# Patient Record
Sex: MALE | Race: WHITE | Employment: FULL TIME | URBAN - METROPOLITAN AREA
[De-identification: names, ages, dates, MRNs, and addresses within clinical notes are randomized per-mention and may not be internally consistent; named-entity substitution may affect disease eponyms.]

---

## 2019-09-13 VITALS — BODY MASS INDEX: 44.43 KG/M2 | HEIGHT: 69 IN | WEIGHT: 300 LBS

## 2019-09-13 RX ORDER — METHOCARBAMOL 500 MG/1
750 TABLET, FILM COATED ORAL EVERY 6 HOURS PRN
COMMUNITY
Start: 2019-01-21

## 2019-09-13 RX ORDER — FENOFIBRATE 67 MG/1
67 CAPSULE ORAL DAILY
COMMUNITY
Start: 2019-05-22

## 2019-09-13 RX ORDER — ASPIRIN 81 MG/1
81 TABLET, CHEWABLE ORAL DAILY
Status: ON HOLD | COMMUNITY
End: 2019-09-19 | Stop reason: HOSPADM

## 2019-09-13 RX ORDER — ALPRAZOLAM 1 MG/1
1 TABLET ORAL 2 TIMES DAILY PRN
COMMUNITY
Start: 2019-05-22

## 2019-09-13 RX ORDER — TESTOSTERONE CYPIONATE 200 MG/ML
200 VIAL (ML) INTRAMUSCULAR
COMMUNITY

## 2019-09-13 RX ORDER — OMEPRAZOLE 40 MG/1
40 CAPSULE, DELAYED RELEASE ORAL DAILY
COMMUNITY
Start: 2019-05-22

## 2019-09-13 RX ORDER — AMLODIPINE BESYLATE 10 MG/1
10 TABLET ORAL EVERY EVENING
COMMUNITY
Start: 2019-05-22

## 2019-09-13 RX ORDER — ATORVASTATIN CALCIUM 40 MG/1
40 TABLET, FILM COATED ORAL DAILY
COMMUNITY
Start: 2019-05-22

## 2019-09-13 RX ORDER — METOPROLOL TARTRATE 50 MG/1
50 TABLET, FILM COATED ORAL 2 TIMES DAILY
COMMUNITY
Start: 2019-05-22

## 2019-09-13 RX ORDER — GABAPENTIN 300 MG/1
300 CAPSULE ORAL 2 TIMES DAILY
COMMUNITY
Start: 2019-05-22

## 2019-09-13 RX ORDER — IBUPROFEN 600 MG/1
600 TABLET ORAL 3 TIMES DAILY PRN
Status: ON HOLD | COMMUNITY
Start: 2019-05-22 | End: 2019-09-19 | Stop reason: HOSPADM

## 2019-09-13 RX ORDER — BIOTIN 5 MG
1 TABLET ORAL DAILY
COMMUNITY

## 2019-09-13 RX ORDER — RANITIDINE 300 MG/1
300 TABLET ORAL EVERY EVENING
COMMUNITY
Start: 2019-05-22

## 2019-09-13 RX ORDER — CITALOPRAM 20 MG/1
20 TABLET ORAL DAILY
COMMUNITY
Start: 2019-01-14

## 2019-09-13 NOTE — PROGRESS NOTES
Name_______________________________________Printed:____________________  Date and time of xtmlyvl_5-00-38 1100___0900 MAIN_______________   1. Do not eat or drink anything after 12 midnight (or____hours) prior to surgery. This includes no water, chewing gum or mints. Endoscopy patients follow your doctors bowel prep instructions,which may include taking part of prep after midnight. 2. Take the following pills with a small sip of water on the morning of surgery_ METOPROLOL _CITALOPRAM, GABAPENTIN _________________________________________________                  Do not take blood pressure medications ending in pril or sartan the justine prior to surgery or the morning of surgery_   3. Aspirin, Ibuprofen, Advil, Naproxen, Vitamin E and other Anti-inflammatory products should be stopped for 5 days before surgery or as directed by your physician. 4. Check with your Doctor regarding stopping Plavix, Coumadin,Eliquis, Lovenox,Effient,Pradaxa,Xarelto, Fragmin or other blood thinners and follow their instructions. 5. Do not smoke, and do not drink any alcoholic beverages 24 hours prior to surgery. This includes NA Beer. Refrain from the usage of any recreational drugs. 6. You may brush your teeth and gargle the morning of surgery. DO NOT SWALLOW WATER   7. You MUST make arrangements for a responsible adult to stay on site while you are here and take you home after your surgery. You will not be allowed to leave alone or drive yourself home. It is strongly suggested someone stay with you the first 24 hrs. Your surgery will be cancelled if you do not have a ride home. 8. A parent/legal guardian must accompany a child scheduled for surgery and plan to stay at the hospital until the child is discharged. Please do not bring other children with you.    9. Please wear simple, loose fitting clothing to the hospital.  Do not bring valuables (money, credit cards, checkbooks, etc.) Do not wear any makeup (including no eye makeup) or nail polish on your fingers or toes. 10. DO NOT wear any jewelry or piercings on day of surgery. All body piercing jewelry must be removed. 11. If you have ___dentures, they will be removed before going to the OR; we will provide you a container. If you wear ___contact lenses or ___glasses, they will be removed; please bring a case for them. 12. Please see your family doctor/pediatrician for a history & physical and/or concerning medications. Bring any test results/reports from your physician's office. PCP__________________Phone___________H&P Appt. Date________             13 If you  have a Living Will and Durable Power of  for Healthcare, please bring in a copy. 15. Notify your Surgeon if you develop any illness between now and surgery  time, cough, cold, fever, sore throat, nausea, vomiting, etc.  Please notify your surgeon if you experience dizziness, shortness of breath or blurred vision between now & the time of your surgery             15. DO NOT shave your operative site 96 hours prior to surgery. For face & neck surgery, men may use an electric razor 48 hours prior to surgery. 16. Shower the night before surgery with ___Antibacterial soap ___Hibiclens             17. To provide excellent care visitors will be limited to one in the room at any given time. 18.  Please bring picture ID and insurance card. 19.  Visit our web site for additional information:  [email or web address]              20.During flu season no children under the age of 15 are permitted in the hospital for the safety of all patients.                               21. If you take a long acting insulin in the evening only  take half of your usual  dose the night  before your procedure              22. If you use a c-pap please bring DOS if staying overnight,             23.For your convenience [facility] has a pharmacy on site to fill

## 2019-09-16 ENCOUNTER — ANESTHESIA EVENT (OUTPATIENT)
Dept: OPERATING ROOM | Age: 44
End: 2019-09-16
Payer: COMMERCIAL

## 2019-09-16 ENCOUNTER — HOSPITAL ENCOUNTER (OUTPATIENT)
Dept: PREADMISSION TESTING | Age: 44
Discharge: HOME | End: 2019-09-20
Payer: COMMERCIAL

## 2019-09-16 RX ORDER — SODIUM CHLORIDE 9 MG/ML
INJECTION, SOLUTION INTRAVENOUS CONTINUOUS
Status: CANCELLED | OUTPATIENT
Start: 2019-09-16

## 2019-09-16 RX ORDER — CLINDAMYCIN PHOSPHATE 900 MG/50ML
900 INJECTION INTRAVENOUS
Status: CANCELLED | OUTPATIENT
Start: 2019-09-16 | End: 2019-09-16

## 2019-09-16 RX ORDER — LIDOCAINE HYDROCHLORIDE 10 MG/ML
0.5 INJECTION, SOLUTION EPIDURAL; INFILTRATION; INTRACAUDAL; PERINEURAL ONCE
Status: CANCELLED | OUTPATIENT
Start: 2019-09-16 | End: 2019-09-16

## 2019-09-16 ASSESSMENT — PAIN DESCRIPTION - PROGRESSION: CLINICAL_PROGRESSION: GRADUALLY WORSENING

## 2019-09-16 ASSESSMENT — PAIN DESCRIPTION - ONSET: ONSET: PROGRESSIVE

## 2019-09-16 ASSESSMENT — PAIN DESCRIPTION - LOCATION: LOCATION: NECK;SHOULDER;ARM;HAND

## 2019-09-16 ASSESSMENT — PAIN DESCRIPTION - FREQUENCY: FREQUENCY: CONTINUOUS

## 2019-09-16 ASSESSMENT — PAIN SCALES - GENERAL: PAINLEVEL_OUTOF10: 7

## 2019-09-16 ASSESSMENT — PAIN - FUNCTIONAL ASSESSMENT: PAIN_FUNCTIONAL_ASSESSMENT: PREVENTS OR INTERFERES WITH ALL ACTIVE AND SOME PASSIVE ACTIVITIES

## 2019-09-16 ASSESSMENT — PAIN DESCRIPTION - PAIN TYPE: TYPE: CHRONIC PAIN;INTRACTABLE PAIN

## 2019-09-16 ASSESSMENT — PAIN DESCRIPTION - ORIENTATION: ORIENTATION: LEFT

## 2019-09-17 ENCOUNTER — ANESTHESIA (OUTPATIENT)
Dept: PREADMISSION TESTING | Age: 44
End: 2019-09-17
Payer: COMMERCIAL

## 2019-09-17 ENCOUNTER — HOSPITAL ENCOUNTER (OUTPATIENT)
Age: 44
Discharge: HOME | End: 2019-09-19
Attending: NEUROLOGICAL SURGERY | Admitting: NEUROLOGICAL SURGERY
Payer: COMMERCIAL

## 2019-09-17 ENCOUNTER — APPOINTMENT (OUTPATIENT)
Dept: GENERAL RADIOLOGY | Age: 44
End: 2019-09-17
Attending: NEUROLOGICAL SURGERY
Payer: COMMERCIAL

## 2019-09-17 VITALS
SYSTOLIC BLOOD PRESSURE: 130 MMHG | TEMPERATURE: 98.8 F | RESPIRATION RATE: 11 BRPM | OXYGEN SATURATION: 98 % | DIASTOLIC BLOOD PRESSURE: 76 MMHG

## 2019-09-17 PROBLEM — M50.20 CERVICAL DISC HERNIATION: Status: ACTIVE | Noted: 2019-09-17

## 2019-09-17 PROBLEM — E78.00 HIGH CHOLESTEROL: Status: ACTIVE | Noted: 2019-09-17

## 2019-09-17 PROBLEM — I10 HTN (HYPERTENSION): Status: ACTIVE | Noted: 2019-09-17

## 2019-09-17 PROBLEM — E11.9 DM2 (DIABETES MELLITUS, TYPE 2) (HCC): Status: ACTIVE | Noted: 2019-09-17

## 2019-09-17 PROBLEM — F32.A DEPRESSION: Status: ACTIVE | Noted: 2019-09-17

## 2019-09-17 LAB
GLUCOSE BLD-MCNC: 142 MG/DL (ref 70–99)
PERFORMED ON: ABNORMAL

## 2019-09-17 PROCEDURE — 2780000010 HC IMPLANT OTHER: Performed by: NEUROLOGICAL SURGERY

## 2019-09-17 PROCEDURE — 6360000002 HC RX W HCPCS: Performed by: NURSE ANESTHETIST, CERTIFIED REGISTERED

## 2019-09-17 PROCEDURE — 2720000010 HC SURG SUPPLY STERILE: Performed by: NEUROLOGICAL SURGERY

## 2019-09-17 PROCEDURE — 94761 N-INVAS EAR/PLS OXIMETRY MLT: CPT

## 2019-09-17 PROCEDURE — 2500000003 HC RX 250 WO HCPCS: Performed by: NEUROLOGICAL SURGERY

## 2019-09-17 PROCEDURE — 2580000003 HC RX 258: Performed by: NEUROLOGICAL SURGERY

## 2019-09-17 PROCEDURE — 7100000001 HC PACU RECOVERY - ADDTL 15 MIN: Performed by: NEUROLOGICAL SURGERY

## 2019-09-17 PROCEDURE — 94150 VITAL CAPACITY TEST: CPT

## 2019-09-17 PROCEDURE — 2580000003 HC RX 258: Performed by: NURSE ANESTHETIST, CERTIFIED REGISTERED

## 2019-09-17 PROCEDURE — 72020 X-RAY EXAM OF SPINE 1 VIEW: CPT

## 2019-09-17 PROCEDURE — 7100000000 HC PACU RECOVERY - FIRST 15 MIN: Performed by: NEUROLOGICAL SURGERY

## 2019-09-17 PROCEDURE — 2709999900 HC NON-CHARGEABLE SUPPLY: Performed by: NEUROLOGICAL SURGERY

## 2019-09-17 PROCEDURE — 6360000002 HC RX W HCPCS

## 2019-09-17 PROCEDURE — 3600000014 HC SURGERY LEVEL 4 ADDTL 15MIN: Performed by: NEUROLOGICAL SURGERY

## 2019-09-17 PROCEDURE — 94760 N-INVAS EAR/PLS OXIMETRY 1: CPT

## 2019-09-17 PROCEDURE — 2700000000 HC OXYGEN THERAPY PER DAY

## 2019-09-17 PROCEDURE — 6370000000 HC RX 637 (ALT 250 FOR IP): Performed by: INTERNAL MEDICINE

## 2019-09-17 PROCEDURE — 3700000001 HC ADD 15 MINUTES (ANESTHESIA): Performed by: NEUROLOGICAL SURGERY

## 2019-09-17 PROCEDURE — 3600000004 HC SURGERY LEVEL 4 BASE: Performed by: NEUROLOGICAL SURGERY

## 2019-09-17 PROCEDURE — 2500000003 HC RX 250 WO HCPCS: Performed by: NURSE ANESTHETIST, CERTIFIED REGISTERED

## 2019-09-17 PROCEDURE — 6370000000 HC RX 637 (ALT 250 FOR IP): Performed by: NEUROLOGICAL SURGERY

## 2019-09-17 PROCEDURE — 3700000000 HC ANESTHESIA ATTENDED CARE: Performed by: NEUROLOGICAL SURGERY

## 2019-09-17 PROCEDURE — C1713 ANCHOR/SCREW BN/BN,TIS/BN: HCPCS | Performed by: NEUROLOGICAL SURGERY

## 2019-09-17 PROCEDURE — C9113 INJ PANTOPRAZOLE SODIUM, VIA: HCPCS

## 2019-09-17 DEVICE — DBM T45001 1CC PASTE GRAFTON PLUS
Type: IMPLANTABLE DEVICE | Site: NECK | Status: FUNCTIONAL
Brand: GRAFTON®AND GRAFTON PLUS®DEMINERALIZED BONE MATRIX (DBM)

## 2019-09-17 DEVICE — GRAFT HUM TISS W11XH6XL14MM CORT LORDTC SPCR BLK: Type: IMPLANTABLE DEVICE | Site: NECK | Status: FUNCTIONAL

## 2019-09-17 DEVICE — BONE SCREW 9791325 4.0X15 SELF DRILL VAR
Type: IMPLANTABLE DEVICE | Site: NECK | Status: FUNCTIONAL
Brand: VENTURE™ ANTERIOR CERVICAL PLATE SYSTEM

## 2019-09-17 DEVICE — IMPLANTABLE DEVICE: Type: IMPLANTABLE DEVICE | Site: NECK | Status: FUNCTIONAL

## 2019-09-17 RX ORDER — SODIUM CHLORIDE 9 MG/ML
INJECTION, SOLUTION INTRAVENOUS CONTINUOUS
Status: CANCELLED | OUTPATIENT
Start: 2019-09-17

## 2019-09-17 RX ORDER — FAMOTIDINE 20 MG/1
40 TABLET, FILM COATED ORAL EVERY EVENING
Status: DISCONTINUED | OUTPATIENT
Start: 2019-09-17 | End: 2019-09-19 | Stop reason: HOSPADM

## 2019-09-17 RX ORDER — SODIUM CHLORIDE 0.9 % (FLUSH) 0.9 %
10 SYRINGE (ML) INJECTION EVERY 12 HOURS SCHEDULED
Status: DISCONTINUED | OUTPATIENT
Start: 2019-09-17 | End: 2019-09-19 | Stop reason: HOSPADM

## 2019-09-17 RX ORDER — CLINDAMYCIN PHOSPHATE 900 MG/50ML
900 INJECTION INTRAVENOUS
Status: COMPLETED | OUTPATIENT
Start: 2019-09-17 | End: 2019-09-17

## 2019-09-17 RX ORDER — OXYCODONE HYDROCHLORIDE 5 MG/1
10 TABLET ORAL EVERY 4 HOURS PRN
Status: DISCONTINUED | OUTPATIENT
Start: 2019-09-17 | End: 2019-09-19 | Stop reason: HOSPADM

## 2019-09-17 RX ORDER — ONDANSETRON 2 MG/ML
4 INJECTION INTRAMUSCULAR; INTRAVENOUS EVERY 6 HOURS PRN
Status: DISCONTINUED | OUTPATIENT
Start: 2019-09-17 | End: 2019-09-19 | Stop reason: HOSPADM

## 2019-09-17 RX ORDER — PROMETHAZINE HYDROCHLORIDE 25 MG/ML
INJECTION, SOLUTION INTRAMUSCULAR; INTRAVENOUS PRN
Status: DISCONTINUED | OUTPATIENT
Start: 2019-09-17 | End: 2019-09-17 | Stop reason: SDUPTHER

## 2019-09-17 RX ORDER — ALPRAZOLAM 0.5 MG/1
1 TABLET ORAL 2 TIMES DAILY PRN
Status: DISCONTINUED | OUTPATIENT
Start: 2019-09-17 | End: 2019-09-19 | Stop reason: HOSPADM

## 2019-09-17 RX ORDER — SUCCINYLCHOLINE CHLORIDE 20 MG/ML
INJECTION INTRAMUSCULAR; INTRAVENOUS PRN
Status: DISCONTINUED | OUTPATIENT
Start: 2019-09-17 | End: 2019-09-17 | Stop reason: SDUPTHER

## 2019-09-17 RX ORDER — PROPOFOL 10 MG/ML
INJECTION, EMULSION INTRAVENOUS PRN
Status: DISCONTINUED | OUTPATIENT
Start: 2019-09-17 | End: 2019-09-17 | Stop reason: SDUPTHER

## 2019-09-17 RX ORDER — GABAPENTIN 300 MG/1
300 CAPSULE ORAL 2 TIMES DAILY
Status: DISCONTINUED | OUTPATIENT
Start: 2019-09-17 | End: 2019-09-19 | Stop reason: HOSPADM

## 2019-09-17 RX ORDER — POTASSIUM CHLORIDE 7.45 MG/ML
10 INJECTION INTRAVENOUS PRN
Status: DISCONTINUED | OUTPATIENT
Start: 2019-09-17 | End: 2019-09-19 | Stop reason: HOSPADM

## 2019-09-17 RX ORDER — FENTANYL CITRATE 50 UG/ML
50 INJECTION, SOLUTION INTRAMUSCULAR; INTRAVENOUS EVERY 5 MIN PRN
Status: DISCONTINUED | OUTPATIENT
Start: 2019-09-17 | End: 2019-09-17 | Stop reason: HOSPADM

## 2019-09-17 RX ORDER — PROPOFOL 10 MG/ML
INJECTION, EMULSION INTRAVENOUS CONTINUOUS PRN
Status: DISCONTINUED | OUTPATIENT
Start: 2019-09-17 | End: 2019-09-17 | Stop reason: SDUPTHER

## 2019-09-17 RX ORDER — AMLODIPINE BESYLATE 5 MG/1
10 TABLET ORAL EVERY EVENING
Status: DISCONTINUED | OUTPATIENT
Start: 2019-09-17 | End: 2019-09-19 | Stop reason: HOSPADM

## 2019-09-17 RX ORDER — PROPOFOL 10 MG/ML
INJECTION, EMULSION INTRAVENOUS
Status: DISCONTINUED
Start: 2019-09-17 | End: 2019-09-17

## 2019-09-17 RX ORDER — SODIUM CHLORIDE 9 MG/ML
INJECTION, SOLUTION INTRAVENOUS CONTINUOUS
Status: DISCONTINUED | OUTPATIENT
Start: 2019-09-17 | End: 2019-09-17

## 2019-09-17 RX ORDER — FENTANYL CITRATE 50 UG/ML
25 INJECTION, SOLUTION INTRAMUSCULAR; INTRAVENOUS EVERY 5 MIN PRN
Status: DISCONTINUED | OUTPATIENT
Start: 2019-09-17 | End: 2019-09-17 | Stop reason: HOSPADM

## 2019-09-17 RX ORDER — HYDRALAZINE HYDROCHLORIDE 20 MG/ML
10 INJECTION INTRAMUSCULAR; INTRAVENOUS EVERY 6 HOURS PRN
Status: DISCONTINUED | OUTPATIENT
Start: 2019-09-17 | End: 2019-09-19 | Stop reason: HOSPADM

## 2019-09-17 RX ORDER — FENOFIBRATE 160 MG/1
160 TABLET ORAL DAILY
Status: DISCONTINUED | OUTPATIENT
Start: 2019-09-17 | End: 2019-09-19 | Stop reason: HOSPADM

## 2019-09-17 RX ORDER — EPHEDRINE SULFATE 50 MG/ML
INJECTION INTRAVENOUS PRN
Status: DISCONTINUED | OUTPATIENT
Start: 2019-09-17 | End: 2019-09-17 | Stop reason: SDUPTHER

## 2019-09-17 RX ORDER — FENTANYL CITRATE 50 UG/ML
INJECTION, SOLUTION INTRAMUSCULAR; INTRAVENOUS PRN
Status: DISCONTINUED | OUTPATIENT
Start: 2019-09-17 | End: 2019-09-17 | Stop reason: SDUPTHER

## 2019-09-17 RX ORDER — SODIUM CHLORIDE, SODIUM LACTATE, POTASSIUM CHLORIDE, CALCIUM CHLORIDE 600; 310; 30; 20 MG/100ML; MG/100ML; MG/100ML; MG/100ML
INJECTION, SOLUTION INTRAVENOUS CONTINUOUS
Status: CANCELLED | OUTPATIENT
Start: 2019-09-17

## 2019-09-17 RX ORDER — 0.9 % SODIUM CHLORIDE 0.9 %
500 INTRAVENOUS SOLUTION INTRAVENOUS PRN
Status: DISCONTINUED | OUTPATIENT
Start: 2019-09-17 | End: 2019-09-19 | Stop reason: HOSPADM

## 2019-09-17 RX ORDER — PANTOPRAZOLE SODIUM 40 MG/1
40 TABLET, DELAYED RELEASE ORAL
Status: DISCONTINUED | OUTPATIENT
Start: 2019-09-18 | End: 2019-09-19 | Stop reason: HOSPADM

## 2019-09-17 RX ORDER — HYDROMORPHONE HYDROCHLORIDE 1 MG/ML
0.5 INJECTION, SOLUTION INTRAMUSCULAR; INTRAVENOUS; SUBCUTANEOUS
Status: DISCONTINUED | OUTPATIENT
Start: 2019-09-17 | End: 2019-09-19 | Stop reason: HOSPADM

## 2019-09-17 RX ORDER — CLINDAMYCIN PHOSPHATE 900 MG/50ML
900 INJECTION INTRAVENOUS EVERY 8 HOURS
Status: COMPLETED | OUTPATIENT
Start: 2019-09-17 | End: 2019-09-18

## 2019-09-17 RX ORDER — NICOTINE 21 MG/24HR
1 PATCH, TRANSDERMAL 24 HOURS TRANSDERMAL DAILY
Status: DISCONTINUED | OUTPATIENT
Start: 2019-09-17 | End: 2019-09-19 | Stop reason: HOSPADM

## 2019-09-17 RX ORDER — SODIUM CHLORIDE 9 MG/ML
INJECTION, SOLUTION INTRAVENOUS CONTINUOUS PRN
Status: DISCONTINUED | OUTPATIENT
Start: 2019-09-17 | End: 2019-09-17 | Stop reason: SDUPTHER

## 2019-09-17 RX ORDER — PROPOFOL 10 MG/ML
INJECTION, EMULSION INTRAVENOUS
Status: COMPLETED
Start: 2019-09-17 | End: 2019-09-17

## 2019-09-17 RX ORDER — LIDOCAINE HYDROCHLORIDE 20 MG/ML
INJECTION, SOLUTION EPIDURAL; INFILTRATION; INTRACAUDAL; PERINEURAL PRN
Status: DISCONTINUED | OUTPATIENT
Start: 2019-09-17 | End: 2019-09-17 | Stop reason: SDUPTHER

## 2019-09-17 RX ORDER — DEXTROSE, SODIUM CHLORIDE, AND POTASSIUM CHLORIDE 5; .45; .15 G/100ML; G/100ML; G/100ML
1000 INJECTION INTRAVENOUS CONTINUOUS
Status: DISCONTINUED | OUTPATIENT
Start: 2019-09-17 | End: 2019-09-19 | Stop reason: HOSPADM

## 2019-09-17 RX ORDER — MAGNESIUM HYDROXIDE 1200 MG/15ML
LIQUID ORAL CONTINUOUS PRN
Status: COMPLETED | OUTPATIENT
Start: 2019-09-17 | End: 2019-09-17

## 2019-09-17 RX ORDER — DEXAMETHASONE SODIUM PHOSPHATE 4 MG/ML
INJECTION, SOLUTION INTRA-ARTICULAR; INTRALESIONAL; INTRAMUSCULAR; INTRAVENOUS; SOFT TISSUE PRN
Status: DISCONTINUED | OUTPATIENT
Start: 2019-09-17 | End: 2019-09-17 | Stop reason: SDUPTHER

## 2019-09-17 RX ORDER — DOCUSATE SODIUM 100 MG/1
100 CAPSULE, LIQUID FILLED ORAL 2 TIMES DAILY
Status: DISCONTINUED | OUTPATIENT
Start: 2019-09-17 | End: 2019-09-19 | Stop reason: HOSPADM

## 2019-09-17 RX ORDER — CITALOPRAM 20 MG/1
20 TABLET ORAL DAILY
Status: DISCONTINUED | OUTPATIENT
Start: 2019-09-18 | End: 2019-09-19 | Stop reason: HOSPADM

## 2019-09-17 RX ORDER — GLYCOPYRROLATE 1 MG/5 ML
SYRINGE (ML) INTRAVENOUS PRN
Status: DISCONTINUED | OUTPATIENT
Start: 2019-09-17 | End: 2019-09-17 | Stop reason: SDUPTHER

## 2019-09-17 RX ORDER — HYDROMORPHONE HCL 110MG/55ML
0.25 PATIENT CONTROLLED ANALGESIA SYRINGE INTRAVENOUS EVERY 5 MIN PRN
Status: DISCONTINUED | OUTPATIENT
Start: 2019-09-17 | End: 2019-09-17 | Stop reason: HOSPADM

## 2019-09-17 RX ORDER — PANTOPRAZOLE SODIUM 40 MG/10ML
INJECTION, POWDER, LYOPHILIZED, FOR SOLUTION INTRAVENOUS
Status: COMPLETED
Start: 2019-09-17 | End: 2019-09-17

## 2019-09-17 RX ORDER — PANTOPRAZOLE SODIUM 40 MG/1
40 TABLET, DELAYED RELEASE ORAL DAILY
Status: DISCONTINUED | OUTPATIENT
Start: 2019-09-17 | End: 2019-09-17 | Stop reason: SDUPTHER

## 2019-09-17 RX ORDER — SODIUM CHLORIDE 0.9 % (FLUSH) 0.9 %
10 SYRINGE (ML) INJECTION PRN
Status: DISCONTINUED | OUTPATIENT
Start: 2019-09-17 | End: 2019-09-19 | Stop reason: HOSPADM

## 2019-09-17 RX ORDER — BUPIVACAINE HYDROCHLORIDE 5 MG/ML
INJECTION, SOLUTION EPIDURAL; INTRACAUDAL
Status: COMPLETED | OUTPATIENT
Start: 2019-09-17 | End: 2019-09-17

## 2019-09-17 RX ORDER — SODIUM CHLORIDE 0.9 % (FLUSH) 0.9 %
10 SYRINGE (ML) INJECTION EVERY 12 HOURS SCHEDULED
Status: CANCELLED | OUTPATIENT
Start: 2019-09-17

## 2019-09-17 RX ORDER — LABETALOL HYDROCHLORIDE 5 MG/ML
5 INJECTION, SOLUTION INTRAVENOUS EVERY 10 MIN PRN
Status: DISCONTINUED | OUTPATIENT
Start: 2019-09-17 | End: 2019-09-17 | Stop reason: HOSPADM

## 2019-09-17 RX ORDER — PANTOPRAZOLE SODIUM 40 MG/10ML
40 INJECTION, POWDER, LYOPHILIZED, FOR SOLUTION INTRAVENOUS DAILY
Status: DISCONTINUED | OUTPATIENT
Start: 2019-09-17 | End: 2019-09-17

## 2019-09-17 RX ORDER — POTASSIUM CHLORIDE 20 MEQ/1
40 TABLET, EXTENDED RELEASE ORAL PRN
Status: DISCONTINUED | OUTPATIENT
Start: 2019-09-17 | End: 2019-09-19 | Stop reason: HOSPADM

## 2019-09-17 RX ORDER — CYCLOBENZAPRINE HCL 10 MG
10 TABLET ORAL 3 TIMES DAILY PRN
Status: DISCONTINUED | OUTPATIENT
Start: 2019-09-17 | End: 2019-09-19 | Stop reason: HOSPADM

## 2019-09-17 RX ORDER — SODIUM CHLORIDE 0.9 % (FLUSH) 0.9 %
10 SYRINGE (ML) INJECTION PRN
Status: CANCELLED | OUTPATIENT
Start: 2019-09-17

## 2019-09-17 RX ORDER — METOPROLOL TARTRATE 50 MG/1
50 TABLET, FILM COATED ORAL 2 TIMES DAILY
Status: DISCONTINUED | OUTPATIENT
Start: 2019-09-17 | End: 2019-09-19 | Stop reason: HOSPADM

## 2019-09-17 RX ORDER — ATORVASTATIN CALCIUM 40 MG/1
40 TABLET, FILM COATED ORAL NIGHTLY
Status: DISCONTINUED | OUTPATIENT
Start: 2019-09-17 | End: 2019-09-19 | Stop reason: HOSPADM

## 2019-09-17 RX ORDER — RANITIDINE 150 MG/1
300 TABLET ORAL EVERY EVENING
Status: DISCONTINUED | OUTPATIENT
Start: 2019-09-17 | End: 2019-09-17 | Stop reason: CLARIF

## 2019-09-17 RX ORDER — LIDOCAINE HYDROCHLORIDE 10 MG/ML
0.5 INJECTION, SOLUTION EPIDURAL; INFILTRATION; INTRACAUDAL; PERINEURAL ONCE
Status: DISCONTINUED | OUTPATIENT
Start: 2019-09-17 | End: 2019-09-17 | Stop reason: HOSPADM

## 2019-09-17 RX ORDER — OXYCODONE HYDROCHLORIDE 5 MG/1
5 TABLET ORAL EVERY 4 HOURS PRN
Status: DISCONTINUED | OUTPATIENT
Start: 2019-09-17 | End: 2019-09-19 | Stop reason: HOSPADM

## 2019-09-17 RX ORDER — HYDROMORPHONE HCL 110MG/55ML
PATIENT CONTROLLED ANALGESIA SYRINGE INTRAVENOUS PRN
Status: DISCONTINUED | OUTPATIENT
Start: 2019-09-17 | End: 2019-09-17 | Stop reason: SDUPTHER

## 2019-09-17 RX ORDER — ESMOLOL HYDROCHLORIDE 10 MG/ML
INJECTION INTRAVENOUS PRN
Status: DISCONTINUED | OUTPATIENT
Start: 2019-09-17 | End: 2019-09-17 | Stop reason: SDUPTHER

## 2019-09-17 RX ORDER — HYDROMORPHONE HCL 110MG/55ML
0.5 PATIENT CONTROLLED ANALGESIA SYRINGE INTRAVENOUS EVERY 5 MIN PRN
Status: DISCONTINUED | OUTPATIENT
Start: 2019-09-17 | End: 2019-09-17 | Stop reason: HOSPADM

## 2019-09-17 RX ORDER — NALOXONE HYDROCHLORIDE 0.4 MG/ML
INJECTION, SOLUTION INTRAMUSCULAR; INTRAVENOUS; SUBCUTANEOUS PRN
Status: DISCONTINUED | OUTPATIENT
Start: 2019-09-17 | End: 2019-09-17 | Stop reason: SDUPTHER

## 2019-09-17 RX ORDER — OMEPRAZOLE 20 MG/1
40 CAPSULE, DELAYED RELEASE ORAL DAILY
Status: DISCONTINUED | OUTPATIENT
Start: 2019-09-17 | End: 2019-09-17 | Stop reason: CLARIF

## 2019-09-17 RX ADMIN — HYDROMORPHONE HYDROCHLORIDE 0.5 MG: 2 INJECTION, SOLUTION INTRAMUSCULAR; INTRAVENOUS; SUBCUTANEOUS at 13:21

## 2019-09-17 RX ADMIN — HYDROMORPHONE HYDROCHLORIDE 0.5 MG: 2 INJECTION, SOLUTION INTRAMUSCULAR; INTRAVENOUS; SUBCUTANEOUS at 13:55

## 2019-09-17 RX ADMIN — SODIUM CHLORIDE: 9 INJECTION, SOLUTION INTRAVENOUS at 13:09

## 2019-09-17 RX ADMIN — PANTOPRAZOLE SODIUM 40 MG: 40 INJECTION, POWDER, LYOPHILIZED, FOR SOLUTION INTRAVENOUS at 10:20

## 2019-09-17 RX ADMIN — HYDROMORPHONE HYDROCHLORIDE 0.5 MG: 2 INJECTION, SOLUTION INTRAMUSCULAR; INTRAVENOUS; SUBCUTANEOUS at 12:59

## 2019-09-17 RX ADMIN — HYDROMORPHONE HYDROCHLORIDE 0.5 MG: 2 INJECTION, SOLUTION INTRAMUSCULAR; INTRAVENOUS; SUBCUTANEOUS at 13:42

## 2019-09-17 RX ADMIN — POTASSIUM CHLORIDE, DEXTROSE MONOHYDRATE AND SODIUM CHLORIDE 1000 ML: 150; 5; 450 INJECTION, SOLUTION INTRAVENOUS at 21:51

## 2019-09-17 RX ADMIN — HYDROMORPHONE HYDROCHLORIDE 0.5 MG: 1 INJECTION, SOLUTION INTRAMUSCULAR; INTRAVENOUS; SUBCUTANEOUS at 18:53

## 2019-09-17 RX ADMIN — DEXAMETHASONE SODIUM PHOSPHATE 8 MG: 4 INJECTION, SOLUTION INTRAMUSCULAR; INTRAVENOUS at 11:42

## 2019-09-17 RX ADMIN — EPHEDRINE SULFATE 10 MG: 50 INJECTION, SOLUTION INTRAVENOUS at 11:25

## 2019-09-17 RX ADMIN — PROMETHAZINE HYDROCHLORIDE 12.5 MG: 25 INJECTION INTRAMUSCULAR; INTRAVENOUS at 11:54

## 2019-09-17 RX ADMIN — FENTANYL CITRATE 50 MCG: 50 INJECTION, SOLUTION INTRAMUSCULAR; INTRAVENOUS at 11:59

## 2019-09-17 RX ADMIN — PROPOFOL 100 MCG/KG/MIN: 10 INJECTION, EMULSION INTRAVENOUS at 11:25

## 2019-09-17 RX ADMIN — PHENYLEPHRINE HYDROCHLORIDE 100 MCG/MIN: 10 INJECTION INTRAVENOUS at 11:42

## 2019-09-17 RX ADMIN — FENTANYL CITRATE 100 MCG: 50 INJECTION, SOLUTION INTRAMUSCULAR; INTRAVENOUS at 11:18

## 2019-09-17 RX ADMIN — NALOXONE HYDROCHLORIDE 0.4 MG: 0.4 INJECTION, SOLUTION INTRAMUSCULAR; INTRAVENOUS; SUBCUTANEOUS at 14:55

## 2019-09-17 RX ADMIN — Medication 0.2 MG: at 11:25

## 2019-09-17 RX ADMIN — SODIUM CHLORIDE: 9 INJECTION, SOLUTION INTRAVENOUS at 11:05

## 2019-09-17 RX ADMIN — PHENYLEPHRINE HYDROCHLORIDE 100 MCG: 10 INJECTION INTRAVENOUS at 11:35

## 2019-09-17 RX ADMIN — CLINDAMYCIN PHOSPHATE 900 MG: 900 INJECTION, SOLUTION INTRAVENOUS at 19:07

## 2019-09-17 RX ADMIN — FENTANYL CITRATE 50 MCG: 50 INJECTION, SOLUTION INTRAMUSCULAR; INTRAVENOUS at 14:14

## 2019-09-17 RX ADMIN — EPHEDRINE SULFATE 10 MG: 50 INJECTION, SOLUTION INTRAVENOUS at 11:34

## 2019-09-17 RX ADMIN — DOCUSATE SODIUM 100 MG: 100 CAPSULE, LIQUID FILLED ORAL at 21:46

## 2019-09-17 RX ADMIN — PANTOPRAZOLE SODIUM 40 MG: 40 INJECTION, POWDER, FOR SOLUTION INTRAVENOUS at 10:20

## 2019-09-17 RX ADMIN — METOPROLOL TARTRATE 50 MG: 50 TABLET, FILM COATED ORAL at 21:47

## 2019-09-17 RX ADMIN — FENTANYL CITRATE 50 MCG: 50 INJECTION, SOLUTION INTRAMUSCULAR; INTRAVENOUS at 12:02

## 2019-09-17 RX ADMIN — DESMOPRESSIN ACETATE 40 MG: 0.2 TABLET ORAL at 21:46

## 2019-09-17 RX ADMIN — GABAPENTIN 300 MG: 300 CAPSULE ORAL at 21:47

## 2019-09-17 RX ADMIN — CLINDAMYCIN PHOSPHATE 900 MG: 900 INJECTION, SOLUTION INTRAVENOUS at 11:01

## 2019-09-17 RX ADMIN — FENTANYL CITRATE 50 MCG: 50 INJECTION, SOLUTION INTRAMUSCULAR; INTRAVENOUS at 11:40

## 2019-09-17 RX ADMIN — EPHEDRINE SULFATE 10 MG: 50 INJECTION, SOLUTION INTRAVENOUS at 11:30

## 2019-09-17 RX ADMIN — SUCCINYLCHOLINE CHLORIDE 200 MG: 20 INJECTION, SOLUTION INTRAMUSCULAR; INTRAVENOUS at 11:18

## 2019-09-17 RX ADMIN — PROPOFOL 250 MG: 10 INJECTION, EMULSION INTRAVENOUS at 11:18

## 2019-09-17 RX ADMIN — LIDOCAINE HYDROCHLORIDE 100 MG: 20 INJECTION, SOLUTION EPIDURAL; INFILTRATION; INTRACAUDAL; PERINEURAL at 11:18

## 2019-09-17 RX ADMIN — ESMOLOL HYDROCHLORIDE 10 MG: 10 INJECTION, SOLUTION INTRAVENOUS at 14:09

## 2019-09-17 RX ADMIN — OXYCODONE HYDROCHLORIDE 10 MG: 5 TABLET ORAL at 21:47

## 2019-09-17 ASSESSMENT — PULMONARY FUNCTION TESTS
PIF_VALUE: 27
PIF_VALUE: 20
PIF_VALUE: 1
PIF_VALUE: 27
PIF_VALUE: 28
PIF_VALUE: 32
PIF_VALUE: 24
PIF_VALUE: 27
PIF_VALUE: 20
PIF_VALUE: 27
PIF_VALUE: 32
PIF_VALUE: 31
PIF_VALUE: 29
PIF_VALUE: 27
PIF_VALUE: 29
PIF_VALUE: 27
PIF_VALUE: 27
PIF_VALUE: 30
PIF_VALUE: 24
PIF_VALUE: 28
PIF_VALUE: 4
PIF_VALUE: 21
PIF_VALUE: 27
PIF_VALUE: 27
PIF_VALUE: 15
PIF_VALUE: 16
PIF_VALUE: 29
PIF_VALUE: 20
PIF_VALUE: 27
PIF_VALUE: 30
PIF_VALUE: 27
PIF_VALUE: 30
PIF_VALUE: 27
PIF_VALUE: 32
PIF_VALUE: 27
PIF_VALUE: 27
PIF_VALUE: 29
PIF_VALUE: 29
PIF_VALUE: 27
PIF_VALUE: 26
PIF_VALUE: 31
PIF_VALUE: 29
PIF_VALUE: 27
PIF_VALUE: 29
PIF_VALUE: 27
PIF_VALUE: 27
PIF_VALUE: 18
PIF_VALUE: 29
PIF_VALUE: 30
PIF_VALUE: 28
PIF_VALUE: 19
PIF_VALUE: 16
PIF_VALUE: 30
PIF_VALUE: 19
PIF_VALUE: 28
PIF_VALUE: 28
PIF_VALUE: 30
PIF_VALUE: 20
PIF_VALUE: 28
PIF_VALUE: 31
PIF_VALUE: 27
PIF_VALUE: 28
PIF_VALUE: 28
PIF_VALUE: 27
PIF_VALUE: 1
PIF_VALUE: 27
PIF_VALUE: 29
PIF_VALUE: 26
PIF_VALUE: 30
PIF_VALUE: 17
PIF_VALUE: 20
PIF_VALUE: 2
PIF_VALUE: 28
PIF_VALUE: 28
PIF_VALUE: 34
PIF_VALUE: 30
PIF_VALUE: 27
PIF_VALUE: 24
PIF_VALUE: 27
PIF_VALUE: 29
PIF_VALUE: 29
PIF_VALUE: 3
PIF_VALUE: 31
PIF_VALUE: 30
PIF_VALUE: 27
PIF_VALUE: 29
PIF_VALUE: 27
PIF_VALUE: 27
PIF_VALUE: 28
PIF_VALUE: 27
PIF_VALUE: 30
PIF_VALUE: 31
PIF_VALUE: 29
PIF_VALUE: 27
PIF_VALUE: 28
PIF_VALUE: 25
PIF_VALUE: 27
PIF_VALUE: 31
PIF_VALUE: 22
PIF_VALUE: 32
PIF_VALUE: 28
PIF_VALUE: 26
PIF_VALUE: 0
PIF_VALUE: 26
PIF_VALUE: 17
PIF_VALUE: 28
PIF_VALUE: 29
PIF_VALUE: 28
PIF_VALUE: 32
PIF_VALUE: 27
PIF_VALUE: 29
PIF_VALUE: 27
PIF_VALUE: 18
PIF_VALUE: 31
PIF_VALUE: 27
PIF_VALUE: 29
PIF_VALUE: 27
PIF_VALUE: 18
PIF_VALUE: 27
PIF_VALUE: 32
PIF_VALUE: 29
PIF_VALUE: 32
PIF_VALUE: 27
PIF_VALUE: 31
PIF_VALUE: 29
PIF_VALUE: 27
PIF_VALUE: 23
PIF_VALUE: 2
PIF_VALUE: 29
PIF_VALUE: 13
PIF_VALUE: 27
PIF_VALUE: 30
PIF_VALUE: 29
PIF_VALUE: 23
PIF_VALUE: 29
PIF_VALUE: 29
PIF_VALUE: 27
PIF_VALUE: 26
PIF_VALUE: 27
PIF_VALUE: 30
PIF_VALUE: 0
PIF_VALUE: 27
PIF_VALUE: 28
PIF_VALUE: 27
PIF_VALUE: 28
PIF_VALUE: 2
PIF_VALUE: 29
PIF_VALUE: 2
PIF_VALUE: 30
PIF_VALUE: 27
PIF_VALUE: 27
PIF_VALUE: 2
PIF_VALUE: 29
PIF_VALUE: 1
PIF_VALUE: 27
PIF_VALUE: 30
PIF_VALUE: 21
PIF_VALUE: 28
PIF_VALUE: 28
PIF_VALUE: 30
PIF_VALUE: 25
PIF_VALUE: 27
PIF_VALUE: 29
PIF_VALUE: 27
PIF_VALUE: 2
PIF_VALUE: 28
PIF_VALUE: 27
PIF_VALUE: 27
PIF_VALUE: 29
PIF_VALUE: 30
PIF_VALUE: 25
PIF_VALUE: 27
PIF_VALUE: 29
PIF_VALUE: 27
PIF_VALUE: 27
PIF_VALUE: 30
PIF_VALUE: 29
PIF_VALUE: 29
PIF_VALUE: 28
PIF_VALUE: 0
PIF_VALUE: 27
PIF_VALUE: 18
PIF_VALUE: 28
PIF_VALUE: 27
PIF_VALUE: 27
PIF_VALUE: 18
PIF_VALUE: 30
PIF_VALUE: 27
PIF_VALUE: 27
PIF_VALUE: 28
PIF_VALUE: 29
PIF_VALUE: 29
PIF_VALUE: 27
PIF_VALUE: 33
PIF_VALUE: 27
PIF_VALUE: 30
PIF_VALUE: 28
PIF_VALUE: 27
PIF_VALUE: 32
PIF_VALUE: 20
PIF_VALUE: 29
PIF_VALUE: 2
PIF_VALUE: 29
PIF_VALUE: 21
PIF_VALUE: 27
PIF_VALUE: 28
PIF_VALUE: 30
PIF_VALUE: 28
PIF_VALUE: 27
PIF_VALUE: 1
PIF_VALUE: 26

## 2019-09-17 ASSESSMENT — PAIN DESCRIPTION - FREQUENCY: FREQUENCY: CONTINUOUS

## 2019-09-17 ASSESSMENT — PAIN DESCRIPTION - ORIENTATION: ORIENTATION: MID

## 2019-09-17 ASSESSMENT — PAIN DESCRIPTION - PAIN TYPE: TYPE: SURGICAL PAIN

## 2019-09-17 ASSESSMENT — PAIN SCALES - GENERAL
PAINLEVEL_OUTOF10: 7
PAINLEVEL_OUTOF10: 6
PAINLEVEL_OUTOF10: 7

## 2019-09-17 ASSESSMENT — PAIN DESCRIPTION - LOCATION: LOCATION: NECK

## 2019-09-17 ASSESSMENT — PAIN DESCRIPTION - DESCRIPTORS: DESCRIPTORS: ACHING

## 2019-09-17 ASSESSMENT — PAIN DESCRIPTION - DIRECTION: RADIATING_TOWARDS: SHOULDER BLADES

## 2019-09-17 NOTE — PROGRESS NOTES
Incentive Spirometry education and demonstration completed by Respiratory Therapy Yes      Response to education: Excellent     Teaching Time: 5 minutes    Minimum Predicted Vital Capacity - 776 mL. Patient's Actual Vital Capacity - 1800 mL. Turning over to Nursing for routine follow-up Yes.     Comments: IS goal met IS turned to nursing    Electronically signed by Merlyn Anderson on 9/17/2019 at 5:35 PM

## 2019-09-17 NOTE — ANESTHESIA PRE PROCEDURE
Anxiety and depression     BPH (benign prostatic hyperplasia)     Cervical spinal stenosis     Diabetes mellitus (HCC)     GERD (gastroesophageal reflux disease)     H/O difficult intubation     used glidescope in the past    Hyperlipidemia     Hypertension     CHINTAN on CPAP     Uncontrolled diabetes mellitus (HCC)        Past Surgical History:        Procedure Laterality Date    CARDIAC CATHETERIZATION  01/31/2019    CHOLECYSTECTOMY      KNEE CARTILAGE SURGERY Right     X2    ROTATOR CUFF REPAIR      TONSILLECTOMY         Social History:    Social History     Tobacco Use    Smoking status: Former Smoker    Smokeless tobacco: Current User     Types: Snuff, Chew    Tobacco comment: uses 1 can/day SINCE AGE 7   Substance Use Topics    Alcohol use: Not Currently                                Ready to quit: Not Answered  Counseling given: Not Answered  Comment: uses 1 can/day SINCE AGE 7      Vital Signs (Current):   Vitals:    09/13/19 1708   Weight: 300 lb (136.1 kg)   Height: 5' 8.5\" (1.74 m)                                              BP Readings from Last 3 Encounters:   No data found for BP       NPO Status:                                                                                 BMI:   Wt Readings from Last 3 Encounters:   09/13/19 300 lb (136.1 kg)     Body mass index is 44.95 kg/m². CBC: No results found for: WBC, RBC, HGB, HCT, MCV, RDW, PLT    CMP: No results found for: NA, K, CL, CO2, BUN, CREATININE, GFRAA, AGRATIO, LABGLOM, GLUCOSE, PROT, CALCIUM, BILITOT, ALKPHOS, AST, ALT    POC Tests: No results for input(s): POCGLU, POCNA, POCK, POCCL, POCBUN, POCHEMO, POCHCT in the last 72 hours.     Coags: No results found for: PROTIME, INR, APTT    HCG (If Applicable): No results found for: PREGTESTUR, PREGSERUM, HCG, HCGQUANT     ABGs: No results found for: PHART, PO2ART, EKI5ZTB, GIQ8VQN, BEART, A6XDASNZ     Type & Screen (If Applicable):  No results found for: LABABO,
intravenous and rapid sequence. Anesthetic plan and risks discussed with patient. Plan discussed with CRNA. MEDICATIONS:  Current Facility-Administered Medications   Medication Dose Route Frequency Provider Last Rate Last Dose    0.9 % sodium chloride infusion   Intravenous Continuous Michelle Cespedes MD        clindamycin (CLEOCIN) 900 mg in dextrose 5 % 50 mL IVPB  900 mg Intravenous On Call to [address] [place] [address], MD        lidocaine PF 1 % injection 0.5 mL  0.5 mL Intradermal Once Michelle Cespedes MD        pantoprazole (PROTONIX) 40 MG injection             pantoprazole (PROTONIX) injection 40 mg  40 mg Intravenous Daily Lb Lang MD            LABS:  No results found for: WBC, HGB, HCT, MCV, PLT, GLUCOSE, PROTIME, INR, APTT    No results found for: NA, K, CL, CO2, PHOS, BUN, CREATININE    Problem List:  There is no problem list on file for this patient.           Unknown MD Madeline   9/17/2019

## 2019-09-17 NOTE — BRIEF OP NOTE
Brief Postoperative Note  ______________________________________________________________    Patient: Liam Omalley  YOB: 1975  MRN: [id number]  Date of Procedure: 9/17/2019    Pre-Op Diagnosis: M50.021 CERVICAL MYELOPATHY C4C5  M50.022 CERVICAL MYELOPATHY C5-C6  M50.023 CERVICAL MYELOPATHY C6-C7    Post-Op Diagnosis: Same       Procedure(s):  CERVICAL4-CERVICAL5, CERVICAL5-CERVICAL6, CERVICAL6-CERVICAL7 ANTERIOR CERVICAL DISCECTOMY AND FUSION  WITH EVOKES INTRA-OP MONITORING (39702, 22552 x 3, 54755, 34622, 22113, 69857, 36324)    Anesthesia: General, TIVA    Surgeon(s):  Tung Eason MD    Assistant: Donato Dance MFF    Estimated Blood Loss (mL): 50     Complications: None    Specimens:   * No specimens in log *    Implants:  Implant Name Type Inv.  Item Serial No.  Lot No. LRB No. Used   YOU-PASTE AGUILA TISSUE [facility] - [id number] Bone/Graft/Tissue/Human/Synth YOU-PASTE AGUILA TISSUE [facility] [address] [facility]  N/A 1   GRAFT LORDOTIC SR CORTCL BLOCK [id number] - [id number] Bone/Graft/Tissue/Human/Synth GRAFT LORDOTIC SR CORTCL BLOCK [id number] [facility] [id number] N/A 1   WEDGE HTO [id number] LSR - [id number] Spine WEDGE HTO [id number] LSR [id number] [facility] [id number] N/A 1   WEDGE HTO [id number] LSR - [id number] Spine WEDGE HTO [id number] LSR [id number] [employer]  N/A 1   PLATE CERV ANT VENTURE 65MM Spine PLATE CERV ANT VENTURE 65MM  [employer] [employer]  N/A 1   SCREW SELF DRILL JULIETH 4.0X15MM Spine SCREW SELF DRILL JULIETH 4.0X15MM  [employer] [employer]  N/A 8         Drains:   Closed/Suction Drain Anterior Neck Bulb 7 German (Active)       Findings: HCD P17,67,58    Tung Spine, MD  Date: 9/17/2019  Time: 2:32 PM

## 2019-09-17 NOTE — CONSULTS
hospitalization thus far: Active Hospital Problems    Diagnosis    Cervical disc herniation [M50.20]    [facility] [E11.9]    HTN (hypertension) [I10]    Depression [F32.9]    High cholesterol [E78.00]         Review of Systems: (1 system for EPF, 2-9 for detailed, 10+ for comprehensive)  Review of Systems   Constitutional: Negative for chills and fever. HENT: Negative for mouth sores and nosebleeds. Eyes: Negative for pain and redness. Respiratory: Negative for cough and shortness of breath. Cardiovascular: Negative for chest pain. Gastrointestinal: Negative for nausea and vomiting. Endocrine: Negative for cold intolerance and heat intolerance. Genitourinary: Negative for frequency and urgency. Musculoskeletal: Positive for neck pain. Negative for back pain. Allergic/Immunologic: Negative for food allergies. Neurological: Negative for seizures and headaches. Hematological: Negative for adenopathy. Psychiatric/Behavioral: Positive for dysphoric mood. Negative for hallucinations.            Past Medical History:   Past Medical History:   Diagnosis Date    Anxiety and depression     BPH (benign prostatic hyperplasia)     Cervical spinal stenosis     Diabetes mellitus (HCC)     GERD (gastroesophageal reflux disease)     H/O difficult intubation     used glidescope in the past    Hyperlipidemia     Hypertension     CHINTAN on CPAP     Uncontrolled diabetes mellitus ([facility]        Past Surgical History:   Past Surgical History:   Procedure Laterality Date    CARDIAC CATHETERIZATION  01/31/2019    CHOLECYSTECTOMY      KNEE CARTILAGE SURGERY Right     X2    ROTATOR CUFF REPAIR      TONSILLECTOMY         Social History:   Social History     Tobacco History     Smoking Status  Former Smoker    Smokeless Tobacco Use  Current User Smokeless Tobacco Type  Snuff, Chew    Tobacco Comment  uses 1 can/day SINCE AGE 7          Alcohol History     Alcohol Use Status  Not Currently          Drug Use     Drug Use Status  Never          Sexual Activity     Sexually Active  Not Asked                Fam History:   Family History   Problem Relation Age of Onset    Diabetes Mother     Stroke Mother     COPD Mother     Heart Disease Father     High Blood Pressure Father     Diabetes Father     COPD Father        PFSH: The above PMHx, PSHx, SocHx, FamHx has been reviewed by myself. (1 area for detailed, 2-3 for comprehensive)      Code Status: Full Code    Meds - following list ofhome medications from electronic chart has been reviewed by myself  Prior to Admission medications    Medication Sig Start Date End Date Taking? Authorizing Provider   metoprolol tartrate (LOPRESSOR) 50 MG tablet Take 50 mg by mouth 2 times daily 5/22/19  Yes Historical Provider, MD   amLODIPine (NORVASC) 10 MG tablet Take 10 mg by mouth every evening Takes between 1800 and 2100 5/22/19  Yes Historical Provider, MD   metFORMIN (GLUCOPHAGE) 1000 MG tablet Take 1,000 mg by mouth 2 times daily 8/26/19  Yes Historical Provider, MD   ranitidine (ZANTAC) 300 MG tablet Take 300 mg by mouth every evening TAKES AT 6 PM 5/22/19  Yes Historical Provider, MD   ALPRAZolam Leodan Macho) 1 MG tablet Take 1 mg by mouth 2 times daily as needed. 5/22/19  Yes Historical Provider, MD   citalopram (CELEXA) 20 MG tablet Take 20 mg by mouth daily 1/14/19  Yes Historical Provider, MD   gabapentin (NEURONTIN) 300 MG capsule Take 300 mg by mouth 2 times daily.  5/22/19  Yes Historical Provider, MD   methocarbamol (ROBAXIN) 500 MG tablet Take 750 mg by mouth every 6 hours as needed  1/21/19  Yes Historical Provider, MD   fenofibrate micronized (LOFIBRA) 67 MG capsule Take 67 mg by mouth daily 5/22/19  Yes Historical Provider, MD   omeprazole (PRILOSEC) 40 MG delayed release capsule Take 40 mg by mouth daily 5/22/19  Yes Historical Provider, MD   atorvastatin (LIPITOR) 40 MG tablet Take 40 mg by mouth daily 5/22/19  Yes Historical Provider, MD   aspirin 81 MG chewable tablet Take 81 mg by mouth daily    Historical Provider, MD   ibuprofen (ADVIL;MOTRIN) 600 MG tablet Take 600 mg by mouth 3 times daily as needed 5/22/19   Historical Provider, MD Remy Cotton Oil 1000 MG CAPS Take 1 capsule by mouth daily    Historical Provider, MD   Testosterone Cypionate 200 MG/ML SOLN Inject 200 mg into the muscle every 14 days    Historical Provider, MD         Allergies   Allergen Reactions    Sulfa Antibiotics Anaphylaxis    Lisinopril Swelling and Other (See Comments)     Uvula swellin       Ondansetron Hcl Other (See Comments)     \"Migraine\"     Penicillins Hives             EXAM: (2-7 system forEPF/Detailed, ?8 for Comprehensive)  /72   Pulse 95   Temp 98.1 °F (36.7 °C) (Oral)   Resp 12   Wt 293 lb (132.9 kg)   SpO2 95%   BMI 43.90 kg/m²   Constitutional: vitals asabove: alert, appears stated age and cooperative  Psychiatric: normal insight and judgment, oriented to person, place, time, and general circumstances  Head: Normocephalic, without obvious abnormality, atraumatic  Eyes:lids and lashes normal, conjunctivae and sclerae normal and pupils equal, round, reactive to light and accomodation  EMNT: external ears normal, lips normal  Neck: no adenopathy, supple, symmetrical, trachea midline and thyroid not enlarged, symmetric, no tenderness/mass/nodules . Dressing CDI  Respiratory: clear to auscultation and percussion bilaterally with normal respiratory effort  Cardiovascular: normal rate, regular rhythm, normal S1 and S2 and no gallops  Gastrointestinal: soft, non-tender, non-distended, normal bowel sounds, no masses or organomegaly  Lymphatic:   Extremities: no edema no clubbing  Skin: No rashes or nodules noted.   Neurologic:    LABS:  Labs Reviewed   POCT GLUCOSE - Abnormal; Notable for the following components:       Result Value    POC Glucose 142 (*)     All other components within normal limits    Narrative:     Performed at:  [facility] [address], [address]   Phone [phone]         IMAGING:  Imaging results from the ER have been reviewed in the computerizedchart. Xr Cervical Spine 1 Vw    Result Date: 9/17/2019  EXAMINATION: ONE XRAY VIEW OF THE CERVICAL SPINE 9/17/2019 11:36 am COMPARISON: None. HISTORY: ORDERING SYSTEM PROVIDED HISTORY: needle placemebt TECHNOLOGIST PROVIDED HISTORY: Reason for exam:->needle placemebt Acuity: Unknown FINDINGS: 2 fluoroscopic images of the cervical spine were obtained intraoperatively. The metallic probe is within the anterior aspect of the C4-5 disc space. The total fluoroscopy time is 11 seconds. 1. See above. EKG:            MEDICAL DECISION MAKING:    Principal Problem:    Cervical disc herniation - New Problem to me. Doing well post op. Pain controlled. No issues noted. Has not yet worked with therapy. On Iv pain meds  Plan: Continue on post-operative pathway. PT/OT to see patient. Continue pain control - on IV pain meds acutely post op. Work on transitioning to oral pain meds when possible. Will follow serial h/h to monitor for acute blood loss anemia - labs ordered for tomorrow. Active Problems:    DM2 (diabetes mellitus, type 2) ([address] - Established problem. Stable. Sugars ok post op  Plan: Patient placed on controlled carbohydrate diet. Fingerstick sugars to be checked to monitor for both hypoglycemia as well as hyperglycemia. Sliding scale insulin ordered. Glucagon and dextrose ordered for hypoglycemia. Patient will be continued on home medications. Hemoglobin a1c to be ordered to assess efficacy of therapy. HTN (hypertension) - Established problem. Stable. 114/72  Plan: Pt home BP meds reviewed and will be continued. Will monitor labs to assess Creat/K for possible complications of medications. Depression - Established problem. Stable.   Mood stable  Plan: stay on home meds    High cholesterol  Plan: continue statin, monitor for myalgias        Diagnoses as listed above, designated as new or established and plan outlined for each. Data Reviewed:   (1) Lab tests were reviewed or ordered. (1) Radiology tests were reviewed or ordered. (1) Medical test (Echo, EKG, PFT/claribel) were not ordered. (1) History was not obtained from someone other than patient  (1) Old records  were reviewed - see HPI/MDM for pertinent details if review done. (2) Case was discussed with another health care provider: Dr Tacho Mills  (2) Imaging was viewed by myself. (2) EKG    viewed by myself. Thanks for the consult! Will follow along daily while patient is in house.       Jono Alves  9/17/2019

## 2019-09-17 NOTE — PROGRESS NOTES
Consult noted  See orders    Full note to follow    Active Hospital Problems    Diagnosis Date Noted    Cervical disc herniation [M50.20] 09/17/2019    [facility], [facility] [E11.9] 09/17/2019    HTN (hypertension) [I10] 09/17/2019    Depression [F32.9] 09/17/2019    High cholesterol [E78.00] 09/17/2019

## 2019-09-17 NOTE — PROGRESS NOTES
I have reviewed the history and physical  and find no relevant changes. I have reviewed with the patient and/or family the risks, benefits, and alternatives to the procedure.     Tung Eason MD  9/17/2019

## 2019-09-17 NOTE — OP NOTE
[facility] OR  9/17/2019 2:38 PM    PATIENT NAME:         Lynda Fraser  YOB: 1975   MEDICAL RECORD#         [id number]  SURGERY DATE:         9/17/2019  SURGEON:                 Jared De Jesus                                                      OPERATIVE REPORT     PREOPERATIVE DIAGNOSIS: M50.021 CERVICAL MYELOPATHY C4C5  M50.022 CERVICAL MYELOPATHY C5-C6  M50.023 CERVICAL MYELOPATHY C6-C7            POSTOPERATIVE DIAGNOSIS:    Same    SURGEON : Alanis RODRIGUEZ    ASSISTANTFrancee Number SA [id number]     PROCEDUREs:   1. Decompressive anterior cervical discectomy at C45-C56, C6-7.  2. Microdissection using the operating room microscope. 3.Anterior cervical arthrodesis at C45-C56, C6-7.  4. Placement of Venture plate at A70-T95, T2-5.   5. Placement of corticocancellous structural allograft packed with locally harvested autograft bone fillings from the interspace drilling  6. Evoked Potential Monitoring     COMPLICATIONS:  None     ANESTHETIC:          General endotracheal.     ESTIMATED BLOOD LOSS:  25 cc. INDICATIONS:   Lynda Fraser is a 38 y.o. male  with neck pain and radiating extremity pain. The symptoms failed to respond to conservative intervention. An MRI scan confirmed a herniated cervical disc at C45-C56, C6-7. Having failed conservative management and experiencing persistent symptoms, the patient elected to proceed ahead with anterior cervical discectomy with stabilization and fusion at C45, C56, and C6-7. Full informed consent of the operative risks, complications, and expected outcomes were discussed with the patient who fully acknowledged the understanding of these complications and consented to the operation. DESCRIPTION OF PROCEDURE:  The patient was brought to the operating room and placed under general anesthesia. The patient was then placed supine on the operating table. Baseline Evoked potential monitoring was obtained.   The anterior cervical area was prepped and draped in the usual sterile fashion. Using a #15 blade knife, the skin was incised in a horizontal fashion over the C45-C56, C6-7 interspace. Monopolar cautery was used to open the platysma muscle. Blunt dissection was then used to develop the avascular plane between the sternocleidomastoid and the medial strap muscles exposing the prevertebral space at C45-C56 and C6-7. Lateral fluoroscopic imaging was used to confirm proper localization at the C45 level. A self-retaining retractor was placed. The microscope was then brought in and used to assist with performing the microsurgical decompression. Using the high-speed [employer] Scott drill, I removed the anterior osteophytes. I then used pituitary forceps to remove the soft disk material as well as the cartilaginous end plate material.  I then performed a foraminotomy at this level bilaterally. The posterior longitudinal ligament was explored and a F-Origin hook was used to retrieve disk fragments. The posterior longitudinal ligament itself was opened with a 2 mm punch and removed in a piecemeal fashion. Both the central canal and the lateral nerve roots were decompressed bilaterally. The wound was copiously irrigated with antibiotic solution. The endplates were then fashioned using a series of rasps to accept a lordotically shaped spacer. A 6mm corticocancellous structural allograft cage was selected and further packed with locally harvested autograft bone filings from the interspace drilling was carefully impacted into the C4-5 level. The same process was repeated at the C5-6 and C6-7 interspaces. Large disc herniation was encountered at each level. ^mm implant was placed at C56, and 7 mm at C67. A  65mm [employer] Venture cervical spine plate was then placed across the interspaces for stabilization, and secured with 8 15 mm screws. Evoked potntials were unchanged throughout the case.   The wound was then closed in the usual fashion using running 3-0 Vicryl in the platysma and a running 4-0 Monocryl in the subcuticular layer. A sterile dressing was then applied. The patient was extubated in the operating room and transferred to the recovery room in sable condition. There were no complications. In accordance with CMS guidelines, I attest that I was present for the entire procedure from the creation of the skin incision to the closure.       Francheska Lake M.D.

## 2019-09-17 NOTE — ANESTHESIA POSTPROCEDURE EVALUATION
Department of Anesthesiology  Postprocedure Note    Patient: Jack Prado  MRN: [id number]  YOB: 1975  Date of evaluation: 9/17/2019  Time:  4:35 PM     Procedure Summary     Date:  09/17/19 Room / Location:  [facility] OR 02 / [facility] OR    Anesthesia Start:  9318 Anesthesia Stop:  1500    Procedure:  Allyson Mary, CERVICAL6-CERVICAL7 ANTERIOR CERVICAL DISCECTOMY AND FUSION  WITH EVOKES INTRA-OP MONITORING (10802, 22552 x 3, 47027, 25910, 01568, 35126, 70164) (N/A ) Diagnosis:       (M50.021 CERVICAL MYELOPATHY C4C5)      (M50.022 CERVICAL MYELOPATHY C5-C6)      (M50.023 CERVICAL MYELOPATHY C6-C7)    Surgeon:  Poncho Pinto MD Responsible Provider:  Katarzyna Cobian MD    Anesthesia Type:  general, TIVA ASA Status:  4          Anesthesia Type: general, TIVA    Yash Phase I: Yash Score: 8    Yash Phase II:      Last vitals: Reviewed and per EMR flowsheets.        Anesthesia Post Evaluation    Level of consciousness: awake  Complications: no

## 2019-09-18 LAB
ANION GAP SERPL CALCULATED.3IONS-SCNC: 13 MMOL/L (ref 3–16)
BASOPHILS ABSOLUTE: 0 K/UL (ref 0–0.2)
BASOPHILS RELATIVE PERCENT: 0.3 %
BUN BLDV-MCNC: 14 MG/DL (ref 7–20)
CALCIUM SERPL-MCNC: 8.7 MG/DL (ref 8.3–10.6)
CHLORIDE BLD-SCNC: 96 MMOL/L (ref 99–110)
CO2: 25 MMOL/L (ref 21–32)
CREAT SERPL-MCNC: 1 MG/DL (ref 0.9–1.3)
EOSINOPHILS ABSOLUTE: 0 K/UL (ref 0–0.6)
EOSINOPHILS RELATIVE PERCENT: 0.3 %
GFR AFRICAN AMERICAN: >60
GFR NON-AFRICAN AMERICAN: >60
GLUCOSE BLD-MCNC: 195 MG/DL (ref 70–99)
HCT VFR BLD CALC: 41.9 % (ref 40.5–52.5)
HEMOGLOBIN: 14.3 G/DL (ref 13.5–17.5)
LYMPHOCYTES ABSOLUTE: 1.2 K/UL (ref 1–5.1)
LYMPHOCYTES RELATIVE PERCENT: 14.1 %
MCH RBC QN AUTO: 30.1 PG (ref 26–34)
MCHC RBC AUTO-ENTMCNC: 34.1 G/DL (ref 31–36)
MCV RBC AUTO: 88.2 FL (ref 80–100)
MONOCYTES ABSOLUTE: 0.8 K/UL (ref 0–1.3)
MONOCYTES RELATIVE PERCENT: 10 %
NEUTROPHILS ABSOLUTE: 6.3 K/UL (ref 1.7–7.7)
NEUTROPHILS RELATIVE PERCENT: 75.3 %
PDW BLD-RTO: 13.3 % (ref 12.4–15.4)
PLATELET # BLD: 216 K/UL (ref 135–450)
PMV BLD AUTO: 7.3 FL (ref 5–10.5)
POTASSIUM SERPL-SCNC: 4.3 MMOL/L (ref 3.5–5.1)
RBC # BLD: 4.75 M/UL (ref 4.2–5.9)
SODIUM BLD-SCNC: 134 MMOL/L (ref 136–145)
WBC # BLD: 8.4 K/UL (ref 4–11)

## 2019-09-18 PROCEDURE — 97116 GAIT TRAINING THERAPY: CPT

## 2019-09-18 PROCEDURE — 6370000000 HC RX 637 (ALT 250 FOR IP): Performed by: INTERNAL MEDICINE

## 2019-09-18 PROCEDURE — 85025 COMPLETE CBC W/AUTO DIFF WBC: CPT

## 2019-09-18 PROCEDURE — 2580000003 HC RX 258: Performed by: NEUROLOGICAL SURGERY

## 2019-09-18 PROCEDURE — 97165 OT EVAL LOW COMPLEX 30 MIN: CPT

## 2019-09-18 PROCEDURE — 94760 N-INVAS EAR/PLS OXIMETRY 1: CPT

## 2019-09-18 PROCEDURE — 97535 SELF CARE MNGMENT TRAINING: CPT

## 2019-09-18 PROCEDURE — 6370000000 HC RX 637 (ALT 250 FOR IP): Performed by: NEUROLOGICAL SURGERY

## 2019-09-18 PROCEDURE — 97161 PT EVAL LOW COMPLEX 20 MIN: CPT

## 2019-09-18 PROCEDURE — 6360000002 HC RX W HCPCS: Performed by: NEUROLOGICAL SURGERY

## 2019-09-18 PROCEDURE — 80048 BASIC METABOLIC PNL TOTAL CA: CPT

## 2019-09-18 PROCEDURE — 2500000003 HC RX 250 WO HCPCS: Performed by: NEUROLOGICAL SURGERY

## 2019-09-18 PROCEDURE — 36415 COLL VENOUS BLD VENIPUNCTURE: CPT

## 2019-09-18 RX ORDER — METHYLPREDNISOLONE 4 MG/1
4 TABLET ORAL 3 TIMES DAILY
Status: DISCONTINUED | OUTPATIENT
Start: 2019-09-18 | End: 2019-09-19 | Stop reason: HOSPADM

## 2019-09-18 RX ADMIN — HYDROMORPHONE HYDROCHLORIDE 0.5 MG: 1 INJECTION, SOLUTION INTRAMUSCULAR; INTRAVENOUS; SUBCUTANEOUS at 14:41

## 2019-09-18 RX ADMIN — HYDROMORPHONE HYDROCHLORIDE 0.5 MG: 1 INJECTION, SOLUTION INTRAMUSCULAR; INTRAVENOUS; SUBCUTANEOUS at 20:09

## 2019-09-18 RX ADMIN — DOCUSATE SODIUM 100 MG: 100 CAPSULE, LIQUID FILLED ORAL at 08:46

## 2019-09-18 RX ADMIN — METOPROLOL TARTRATE 50 MG: 50 TABLET, FILM COATED ORAL at 08:46

## 2019-09-18 RX ADMIN — OXYCODONE HYDROCHLORIDE 10 MG: 5 TABLET ORAL at 03:56

## 2019-09-18 RX ADMIN — CLINDAMYCIN PHOSPHATE 900 MG: 900 INJECTION, SOLUTION INTRAVENOUS at 03:56

## 2019-09-18 RX ADMIN — GABAPENTIN 300 MG: 300 CAPSULE ORAL at 08:46

## 2019-09-18 RX ADMIN — METFORMIN HYDROCHLORIDE 1000 MG: 500 TABLET ORAL at 16:02

## 2019-09-18 RX ADMIN — DESMOPRESSIN ACETATE 40 MG: 0.2 TABLET ORAL at 20:08

## 2019-09-18 RX ADMIN — Medication 10 ML: at 20:11

## 2019-09-18 RX ADMIN — METHYLPREDNISOLONE 4 MG: 4 TABLET ORAL at 20:08

## 2019-09-18 RX ADMIN — OXYCODONE HYDROCHLORIDE 10 MG: 5 TABLET ORAL at 13:23

## 2019-09-18 RX ADMIN — DOCUSATE SODIUM 100 MG: 100 CAPSULE, LIQUID FILLED ORAL at 20:08

## 2019-09-18 RX ADMIN — HYDROMORPHONE HYDROCHLORIDE 0.5 MG: 1 INJECTION, SOLUTION INTRAMUSCULAR; INTRAVENOUS; SUBCUTANEOUS at 01:01

## 2019-09-18 RX ADMIN — OXYCODONE HYDROCHLORIDE 10 MG: 5 TABLET ORAL at 08:46

## 2019-09-18 RX ADMIN — FENOFIBRATE 160 MG: 160 TABLET ORAL at 08:46

## 2019-09-18 RX ADMIN — Medication 10 ML: at 08:49

## 2019-09-18 RX ADMIN — CYCLOBENZAPRINE HYDROCHLORIDE 10 MG: 10 TABLET, FILM COATED ORAL at 14:08

## 2019-09-18 RX ADMIN — CITALOPRAM HYDROBROMIDE 20 MG: 20 TABLET ORAL at 08:47

## 2019-09-18 RX ADMIN — METOPROLOL TARTRATE 50 MG: 50 TABLET, FILM COATED ORAL at 20:08

## 2019-09-18 RX ADMIN — PANTOPRAZOLE SODIUM 40 MG: 40 TABLET, DELAYED RELEASE ORAL at 06:59

## 2019-09-18 RX ADMIN — AMLODIPINE BESYLATE 10 MG: 5 TABLET ORAL at 17:49

## 2019-09-18 RX ADMIN — FAMOTIDINE 40 MG: 20 TABLET, FILM COATED ORAL at 17:49

## 2019-09-18 RX ADMIN — METFORMIN HYDROCHLORIDE 1000 MG: 500 TABLET ORAL at 08:46

## 2019-09-18 RX ADMIN — GABAPENTIN 300 MG: 300 CAPSULE ORAL at 20:08

## 2019-09-18 RX ADMIN — OXYCODONE HYDROCHLORIDE 10 MG: 5 TABLET ORAL at 17:48

## 2019-09-18 RX ADMIN — METHYLPREDNISOLONE 4 MG: 4 TABLET ORAL at 16:02

## 2019-09-18 ASSESSMENT — PAIN DESCRIPTION - LOCATION
LOCATION: NECK

## 2019-09-18 ASSESSMENT — PAIN DESCRIPTION - ORIENTATION
ORIENTATION: MID
ORIENTATION: MID

## 2019-09-18 ASSESSMENT — ENCOUNTER SYMPTOMS
EYE PAIN: 0
BACK PAIN: 0
VOMITING: 0
EYE REDNESS: 0
SHORTNESS OF BREATH: 0
NAUSEA: 0
COUGH: 0

## 2019-09-18 ASSESSMENT — PAIN SCALES - GENERAL
PAINLEVEL_OUTOF10: 6
PAINLEVEL_OUTOF10: 5
PAINLEVEL_OUTOF10: 7
PAINLEVEL_OUTOF10: 7
PAINLEVEL_OUTOF10: 10
PAINLEVEL_OUTOF10: 0
PAINLEVEL_OUTOF10: 7
PAINLEVEL_OUTOF10: 8
PAINLEVEL_OUTOF10: 7
PAINLEVEL_OUTOF10: 7
PAINLEVEL_OUTOF10: 8
PAINLEVEL_OUTOF10: 5
PAINLEVEL_OUTOF10: 8
PAINLEVEL_OUTOF10: 7

## 2019-09-18 ASSESSMENT — PAIN DESCRIPTION - PAIN TYPE
TYPE: SURGICAL PAIN

## 2019-09-18 ASSESSMENT — PAIN DESCRIPTION - DESCRIPTORS
DESCRIPTORS: ACHING
DESCRIPTORS: ACHING

## 2019-09-18 ASSESSMENT — PAIN DESCRIPTION - DIRECTION
RADIATING_TOWARDS: SHOULDER BLADES
RADIATING_TOWARDS: SHOULDER BLADES

## 2019-09-18 ASSESSMENT — PAIN DESCRIPTION - FREQUENCY
FREQUENCY: CONTINUOUS
FREQUENCY: CONTINUOUS

## 2019-09-18 NOTE — PROGRESS NOTES
Occupational Therapy   Occupational Therapy Initial Assessment  Date: 2019   Patient Name: Robert Yap  MRN: [id number]     : 1975    Date of Service: 2019    Discharge Recommendations:Ar Swanson scored a 23/24 on the AM-[facility] ADL Inpatient form. At this time, no further OT is recommended upon discharge due to patient near baseline function. Recommend patient returns to prior setting with prior services. OT Equipment Recommendations  Equipment Needed: No    Assessment   Assessment: Patient presents near baseline function despite recent surgery. No skilled OT indicated at this time. Treatment Diagnosis: cervical disc herniation  Prognosis: Good  Decision Making: Low Complexity  Patient Education: Log roll, spinal precautions, ADLS, transfers POC, OT role  REQUIRES OT FOLLOW UP: No  Activity Tolerance  Activity Tolerance: Patient Tolerated treatment well;Patient limited by pain  Safety Devices  Safety Devices in place: Yes  Type of devices: Gait belt;Call light within reach; Left in bed;Nurse notified(Low fall risk)           Patient Diagnosis(es): There were no encounter diagnoses. has a past medical history of Anxiety and depression, BPH (benign prostatic hyperplasia), Cervical spinal stenosis, Diabetes mellitus ([address] GERD (gastroesophageal reflux disease), H/O difficult intubation, Hyperlipidemia, Hypertension, CHINTAN on CPAP, and Uncontrolled diabetes mellitus ([address] has a past surgical history that includes Rotator cuff repair; Tonsillectomy; Knee cartilage surgery (Right); Cardiac catheterization (2019); and Cholecystectomy. Treatment Diagnosis: cervical disc herniation      Restrictions  Restrictions/Precautions  Restrictions/Precautions: Fall Risk(high fall risk)  Position Activity Restriction  Other position/activity restrictions:  Robert Yap is a 38 y.o. male  with neck pain and radiating extremity pain.    The symptoms failed to respond to conservative Code Treatment Minutes:  10 Minutes    Total Treatment Minutes:  Nas, [facility]  Brandy King [address]

## 2019-09-18 NOTE — PROGRESS NOTES
Physical Therapy    Facility/Department: [address]  Initial Assessment/Discharge Summary    NAME: Carole Champagne  : 1975  MRN: [id number]    Date of Service: 2019    Discharge Recommendations: Carole Champagne scored a 23/24 on the AM-PAC short mobility form. At this time, no further PT is recommended upon discharge due to pt. Functioning close to baseline and is appropriate for outpatient physical therapy. Recommend patient returns to prior setting with prior services. PT Equipment Recommendations  Equipment Needed: No    Assessment   Assessment: pt. is functioing close to baseline and does not require additional skilled PT during hospital stay at this time. Pt. is appropriate for home and outpatient skilled PT. Prognosis: Excellent  Decision Making: Low Complexity  History: BPH, DM, HTN   Clinical Presentation: Stable   PT Education: Transfer Training;PT Role;Plan of Care;General Safety;Precautions  Patient Education: pt. educated on general precautions after cervical fusion including no lifting about 90 degrees of shoulder flexion, no cervical rotation or flexion, and no lifting over 8 lbs. Pt. educated to ask surgeon for specific precautions. REQUIRES PT FOLLOW UP: No  Activity Tolerance  Activity Tolerance: Patient Tolerated treatment well       Patient Diagnosis(es): There were no encounter diagnoses. has a past medical history of Anxiety and depression, BPH (benign prostatic hyperplasia), Cervical spinal stenosis, Diabetes mellitus ([address] GERD (gastroesophageal reflux disease), H/O difficult intubation, Hyperlipidemia, Hypertension, CHINTAN on CPAP, and Uncontrolled diabetes mellitus ([address] has a past surgical history that includes Rotator cuff repair; Tonsillectomy; Knee cartilage surgery (Right); Cardiac catheterization (2019); and Cholecystectomy.     Restrictions  Restrictions/Precautions  Restrictions/Precautions: Fall Risk(high fall risk)  Position Activity Restriction  Other position/activity restrictions:  Barbie Bryan is a 38 y.o. male  with neck pain and radiating extremity pain. The symptoms failed to respond to conservative intervention. An MRI scan confirmed a herniated cervical disc at C45-C56, C6-7. Having failed conservative management and experiencing persistent symptoms, the patient elected to proceed ahead with anterior cervical discectomy with stabilization and fusion at C45, C56, and C6-7. Full informed consent of the operative risks, complications, and expected outcomes were discussed with the patient who fully acknowledged the understanding of these complications and consented to the operation. Vision/Hearing  Vision: Impaired  Vision Exceptions: Wears glasses for reading  Hearing: Within functional limits     Subjective  General  Chart Reviewed: Yes  Additional Pertinent Hx: BPH, DM, HTN  Family / Caregiver Present: No  Follows Commands: Within Functional Limits  General Comment  Comments: pt. in bathroom at beginning of session, ambulating without assistance. Subjective  Subjective: N/T in both hands, denies N/T in legs.   Pt. reports pain in neck rated at 7/10   Pain Screening  Patient Currently in Pain: Yes  Pain Assessment  Pain Assessment: 0-10  Pain Level: 7  Vital Signs  Patient Currently in Pain: Yes  Pre Treatment Pain Screening  Comments / Details: pt. received pain recently before PT    Orientation  Orientation  Overall Orientation Status: Within Normal Limits  Social/Functional History  Social/Functional History  Lives With: Spouse, Family(lives with spouse and brother and sister in law)  Type of Home: House  Home Layout: Two level(Bedroom and bathroom on top floor)  Home Access: Stairs to enter with rails  Entrance Stairs - Number of Steps: 3 steps to enter   Entrance Stairs - Rails: Left  Bathroom Shower/Tub: Tub/Shower unit(pt. states bathroom is a \"handicap bathroom\")  Bathroom Toilet: Handicap height  Bathroom Equipment: Grab bars in shower, Grab bars around toilet  Bathroom Accessibility: Accessible  Receives Help From: Family  ADL Assistance: Independent  Homemaking Assistance: Independent  Ambulation Assistance: Independent  Transfer Assistance: Independent  Active : Yes  Mode of Transportation: Car  Occupation: Full time employment  Type of occupation:    Leisure & Hobbies: exercising, fishing, and hunting   Additional Comments: 2 falls in last 6 months   Cognition   Cognition  Overall Cognitive Status: WFL    Objective     Observation/Palpation  Posture: Good    AROM RLE (degrees)  RLE AROM: WFL  AROM LLE (degrees)  LLE AROM : WFL  Strength RLE  Strength RLE: WNL  Comment: grossly 5/5  Strength LLE  Strength LLE: WNL  Comment: grossly 5/5     Sensation  Overall Sensation Status: Impaired(Pt. states he is experiencing N/T in BUE, denies N/T in BLE)  Bed mobility  Rolling to Left: Independent  Supine to Sit: Independent  Sit to Supine: Independent  Transfers  Sit to Stand: Independent  Stand to sit: Independent  Ambulation  Ambulation?: Yes  Ambulation 1  Surface: level tile  Device: No Device  Assistance: Supervision  Quality of Gait: bilateral hip external rotation   Gait Deviations: None  Distance: 200'  Stairs/Curb  Stairs?: Yes  Stairs  # Steps : 10  Stairs Height: 6\"  Rails: Left ascending  Device: No Device  Assistance: Stand by assistance     Balance  Posture: Good  Sitting - Static: Good  Sitting - Dynamic: Good  Standing - Static: Good  Standing - Dynamic: Good  Comments: pt. sat EOB for 10 minutes while SPT assessed balance and took subjective history         Plan   Plan  Times per week: discharge   Safety Devices  Type of devices:  All fall risk precautions in place, Bed alarm in place, Call light within reach, Left in bed, Nurse notified  Restraints  Initially in place: No    AM-PAC Score  AM-PAC Inpatient Mobility Raw Score : 23 (09/18/19 2231)  AM-PAC Inpatient T-Scale Score : 56.93

## 2019-09-18 NOTE — PROGRESS NOTES
Consult Progress Note - Dr. Sergio Alcantara - Internal Medicine    Referring MD: Nona Wan MD  PCP: No primary care provider on file. No primary physician on file. None  Hospital Day: 0  Code Status: Full Code  Current Diet: DIET GENERAL;    CC: follow up on medical issues    Subjective:   Chacha Gomez is a 38 y.o. male. he denies problems    Doing ok  Pain controlled  No new issues  Feels better now that he is on side    Pain is controlled. Doing well with therapy. Patient is tolerating diet. Pt denies chest pain, denies shortness of breath, denies nausea,  denies emesis. I have reviewed the patient's medical and social history in detail and updated the computerized patient record.      Active Hospital Problems    Diagnosis Date Noted    Cervical disc herniation [M50.20] 09/17/2019    DM2 (diabetes mellitus, type 2) ([facility] [E11.9] 09/17/2019    HTN (hypertension) [I10] 09/17/2019    Depression [F32.9] 09/17/2019    High cholesterol [E78.00] 09/17/2019       Current Facility-Administered Medications: metoprolol tartrate (LOPRESSOR) tablet 50 mg, 50 mg, Oral, BID  metFORMIN (GLUCOPHAGE) tablet 1,000 mg, 1,000 mg, Oral, BID WC  gabapentin (NEURONTIN) capsule 300 mg, 300 mg, Oral, BID  fenofibrate tablet 160 mg, 160 mg, Oral, Daily  citalopram (CELEXA) tablet 20 mg, 20 mg, Oral, Daily  atorvastatin (LIPITOR) tablet 40 mg, 40 mg, Oral, Nightly  amLODIPine (NORVASC) tablet 10 mg, 10 mg, Oral, QPM  ALPRAZolam (XANAX) tablet 1 mg, 1 mg, Oral, BID PRN  dextrose 5 % and 0.45 % NaCl with KCl 20 mEq infusion, 1,000 mL, Intravenous, Continuous  sodium chloride flush 0.9 % injection 10 mL, 10 mL, Intravenous, 2 times per day  sodium chloride flush 0.9 % injection 10 mL, 10 mL, Intravenous, PRN  cyclobenzaprine (FLEXERIL) tablet 10 mg, 10 mg, Oral, TID PRN  docusate sodium (COLACE) capsule 100 mg, 100 mg, Oral, BID  ondansetron (ZOFRAN) injection 4 mg, 4 mg, Intravenous, Q6H PRN  oxyCODONE (ROXICODONE) immediate release tablet 1500 133/76 98.1 °F (36.7 °C) Temporal 105 19 97 % -- --   09/17/19 0918 132/87 97 °F (36.1 °C) Temporal 86 20 98 % -- 293 lb (132.9 kg)     Patient Vitals for the past 96 hrs (Last 3 readings):   Weight   09/17/19 1914 293 lb (132.9 kg)   09/17/19 0918 293 lb (132.9 kg)         Intake/Output Summary (Last 24 hours) at 9/18/2019 0813  Last data filed at 9/18/2019 0405  Gross per 24 hour   Intake 3511 ml   Output 1115 ml   Net 2396 ml         Physical Exam:  S1, S2 normal, no murmur, rub or gallop, regular rate and rhythm  clear to auscultation bilaterally  abdomen is soft without significant tenderness, masses, organomegaly or guarding  extremities normal, atraumatic, no cyanosis or edema    Labs:  Lab Results   Component Value Date    WBC 8.4 09/18/2019    HGB 14.3 09/18/2019    HCT 41.9 09/18/2019     09/18/2019     (L) 09/18/2019    K 4.3 09/18/2019    CL 96 (L) 09/18/2019    CREATININE 1.0 09/18/2019    BUN 14 09/18/2019    CO2 25 09/18/2019     No results found for: CKTOTAL, CKMB, CKMBINDEX, TROPONINI    Imaging: All recent imaging studies reviewed in computerized chart. Xr Cervical Spine 1 Vw    Result Date: 9/17/2019  EXAMINATION: ONE XRAY VIEW OF THE CERVICAL SPINE 9/17/2019 11:36 am COMPARISON: None. HISTORY: ORDERING SYSTEM PROVIDED HISTORY: needle placemebt TECHNOLOGIST PROVIDED HISTORY: Reason for exam:->needle placemebt Acuity: Unknown FINDINGS: 2 fluoroscopic images of the cervical spine were obtained intraoperatively. The metallic probe is within the anterior aspect of the C4-5 disc space. The total fluoroscopy time is 11 seconds. 1. See above. Assessment and Plan:  Principal Problem:    Cervical disc herniation - Established problem. Improving. Doing ok. Pain manageable  Plan: cont on post op pathway. To work with pt/ot today. Transitioned to oral pain meds  Active Problems:    DM2 (diabetes mellitus, type 2) ([address] - Established problem. Stable.   Glu 195 this am  Plan: cont on ccc diet, sliding scale, home meds    HTN (hypertension) - Established problem. Stable. 132/81   Plan: bp controlled. Continue same meds    Depression - mood stable  Plan: continue celexa    High cholesterol - Established problem. Stable. Plan: on statin. Continue    Disp - medically stable.           Argelia Ornelas  9/18/2019

## 2019-09-18 NOTE — CARE COORDINATION
Patient admitted as Observation/OPIB with an anticipated short hospitalization length of stay. Chart reviewed and it appears that patient has minimal needs for discharge at this time. Discussed with patients nurse and requested that case management be notified if discharge needs are identified. Case management will continue to follow progress and update discharge plan as needed.     Electronically signed by PADILLA Bella on 9/18/2019 at 1:06 PM

## 2019-09-19 VITALS
WEIGHT: 293 LBS | TEMPERATURE: 98.6 F | OXYGEN SATURATION: 96 % | RESPIRATION RATE: 18 BRPM | HEIGHT: 68 IN | SYSTOLIC BLOOD PRESSURE: 127 MMHG | BODY MASS INDEX: 44.41 KG/M2 | DIASTOLIC BLOOD PRESSURE: 91 MMHG | HEART RATE: 68 BPM

## 2019-09-19 PROCEDURE — 6360000002 HC RX W HCPCS: Performed by: NEUROLOGICAL SURGERY

## 2019-09-19 PROCEDURE — 6370000000 HC RX 637 (ALT 250 FOR IP): Performed by: INTERNAL MEDICINE

## 2019-09-19 PROCEDURE — 6370000000 HC RX 637 (ALT 250 FOR IP): Performed by: NEUROLOGICAL SURGERY

## 2019-09-19 RX ORDER — METHYLPREDNISOLONE 4 MG/1
TABLET ORAL
Qty: 1 KIT | Refills: 1 | Status: SHIPPED | OUTPATIENT
Start: 2019-09-19 | End: 2019-09-25

## 2019-09-19 RX ORDER — NICOTINE 21 MG/24HR
1 PATCH, TRANSDERMAL 24 HOURS TRANSDERMAL DAILY
Qty: 30 PATCH | Refills: 3 | Status: SHIPPED | OUTPATIENT
Start: 2019-09-19

## 2019-09-19 RX ORDER — OXYCODONE HYDROCHLORIDE 5 MG/1
5 TABLET ORAL EVERY 4 HOURS PRN
Qty: 30 TABLET | Refills: 0 | Status: SHIPPED | OUTPATIENT
Start: 2019-09-19 | End: 2019-09-26

## 2019-09-19 RX ADMIN — GABAPENTIN 300 MG: 300 CAPSULE ORAL at 09:51

## 2019-09-19 RX ADMIN — METOPROLOL TARTRATE 50 MG: 50 TABLET, FILM COATED ORAL at 09:51

## 2019-09-19 RX ADMIN — CITALOPRAM HYDROBROMIDE 20 MG: 20 TABLET ORAL at 09:51

## 2019-09-19 RX ADMIN — DOCUSATE SODIUM 100 MG: 100 CAPSULE, LIQUID FILLED ORAL at 09:51

## 2019-09-19 RX ADMIN — OXYCODONE HYDROCHLORIDE 5 MG: 5 TABLET ORAL at 05:40

## 2019-09-19 RX ADMIN — PANTOPRAZOLE SODIUM 40 MG: 40 TABLET, DELAYED RELEASE ORAL at 05:41

## 2019-09-19 RX ADMIN — FENOFIBRATE 160 MG: 160 TABLET ORAL at 09:51

## 2019-09-19 RX ADMIN — OXYCODONE HYDROCHLORIDE 10 MG: 5 TABLET ORAL at 09:51

## 2019-09-19 RX ADMIN — METHYLPREDNISOLONE 4 MG: 4 TABLET ORAL at 09:51

## 2019-09-19 RX ADMIN — OXYCODONE HYDROCHLORIDE 10 MG: 5 TABLET ORAL at 00:22

## 2019-09-19 ASSESSMENT — PAIN DESCRIPTION - LOCATION
LOCATION: NECK

## 2019-09-19 ASSESSMENT — PAIN DESCRIPTION - ORIENTATION: ORIENTATION: MID

## 2019-09-19 ASSESSMENT — PAIN DESCRIPTION - PROGRESSION
CLINICAL_PROGRESSION: NOT CHANGED

## 2019-09-19 ASSESSMENT — PAIN DESCRIPTION - PAIN TYPE
TYPE: SURGICAL PAIN

## 2019-09-19 ASSESSMENT — PAIN SCALES - GENERAL
PAINLEVEL_OUTOF10: 7
PAINLEVEL_OUTOF10: 6
PAINLEVEL_OUTOF10: 0
PAINLEVEL_OUTOF10: 7
PAINLEVEL_OUTOF10: 6
PAINLEVEL_OUTOF10: 7
PAINLEVEL_OUTOF10: 6

## 2019-09-19 ASSESSMENT — PAIN DESCRIPTION - FREQUENCY: FREQUENCY: CONTINUOUS

## 2019-09-19 ASSESSMENT — PAIN DESCRIPTION - DESCRIPTORS
DESCRIPTORS: ACHING
DESCRIPTORS: SORE

## 2019-09-19 ASSESSMENT — PAIN DESCRIPTION - ONSET: ONSET: PROGRESSIVE

## 2019-09-19 NOTE — PROGRESS NOTES
Post-op Progress Note - Neurosurgery    Subjective:  Nicho Richardson is a 38 y.o. male. Pain is controlled. Doing well. Patient complains of incisional pain, denies leg/arm pain, denies headache, hoarseness of voice, difficulty swallowing, N/V. Objective:  Blood pressure (!) 128/90, pulse 64, temperature 97.9 °F (36.6 °C), temperature source Oral, resp. rate 18, height 5' 8\" (1.727 m), weight 293 lb (132.9 kg), SpO2 93 %. DRAIN/TUBE OUTPUT:  Closed/Suction Drain Anterior Neck Bulb 7 Macedonian-Output (ml): 10 ml    In: 350 [P.O.:350]  Out: 10 [Drains:10]    Physical Exam:  Incision:healing well, no significant drainage  Motor:Motor exam is symmetrical 5 out of 5 all extremities bilaterally  Activity: Ambulating 25 feet. Labs:  CBC:   Lab Results   Component Value Date    WBC 8.4 09/18/2019    RBC 4.75 09/18/2019     WBC/Hgb/Hct/Plts:  8.4/14.3/41.9/216 (09/18 2770)     Imaging:  Xrays: none    Assessment and Plan:  Principal Problem:    Cervical disc herniation  Active Problems:    DM2 (diabetes mellitus, type 2) (HCC)    HTN (hypertension)    Depression    High cholesterol    POD # 2 S/P ACDF/ at P55,57,14. Pain much better controlled this AM.  Eating breakfast.    OT/PT: Continue to advance activity. Disposition: Home today  Internal Medicine consult per Dr. Rosalind Burch for medical management.     Alicia Christopher  9/19/2019

## 2019-09-19 NOTE — PROGRESS NOTES
Continue present orders/plan. Depression - mood ok  Plan: Continue present orders/plan.      High cholesterol      Disp - medically stable for d/c          Casey Chiang  9/19/2019

## 2019-09-19 NOTE — PROGRESS NOTES
0730 : Pt resting in bed, no s/s of distress. Shift handoff completed with Jimmy Cowan at bedside. 0930 : Pt resting in bed, no s/s of distress. Assessment complete refer to doc suellen. Discussed plan of care including jennifer removal. Pt denies needs, call light in reach. 1300: Pt given all discharge instructions and prescriptions. Discussed recommended follow up and when to call the dr. Ysabel Rutledge questions answered. IV removed without complications.

## (undated) DEVICE — DRAPE 60X42IN RADIOLOGY C ARM ADHES 1013

## (undated) DEVICE — INSTRUMENT 874-455 ML 14X11MMDP CTTR 5MM: Brand: MEDTRONIC REUSABLE INSTRUMENT

## (undated) DEVICE — PROTECTOR EYE PT SELF ADH NS OPT GRD LF

## (undated) DEVICE — Device

## (undated) DEVICE — ADHESIVE SKIN CLSR 0.7ML TOP DERMBND ADV

## (undated) DEVICE — CONTROL SYRINGE LUER-LOCK TIP: Brand: MONOJECT

## (undated) DEVICE — HYPODERMIC SAFETY NEEDLE: Brand: MAGELLAN

## (undated) DEVICE — SUTURE VCRL SZ 3-0 L18IN ABSRB UD L26MM SH 1/2 CIR J864D

## (undated) DEVICE — SURGICAL SUCTION CONNECTING TUBE WITH MALE CONNECTOR AND SUCTION CLAMP, 2 FT. LONG (.6 M), 5 MM I.D.: Brand: CONMED

## (undated) DEVICE — LIMB HOLDER, WRIST/ANKLE: Brand: DEROYAL

## (undated) DEVICE — SURE SET SINGLE BASIN-LF: Brand: MEDLINE INDUSTRIES, INC.

## (undated) DEVICE — CARBIDE MATCH HEAD

## (undated) DEVICE — DRAPE ADOLESCENT  LAPAROTOMY

## (undated) DEVICE — ELECTRODE PT RET AD L9FT HI MOIST COND ADH HYDRGEL CORDED

## (undated) DEVICE — STERILE LATEX POWDER-FREE SURGICAL GLOVESWITH NITRILE AND EMOLLIENT COATINGS: Brand: PROTEXIS

## (undated) DEVICE — COVER LT HNDL BLU PLAS

## (undated) DEVICE — SOLUTION IV IRRIG POUR BRL 0.9% SODIUM CHL 2F7124

## (undated) DEVICE — 3M™ WARMING BLANKET, UPPER BODY, 10 PER CASE, 42268: Brand: BAIR HUGGER™

## (undated) DEVICE — TOWEL,OR,DSP,ST,BLUE,STD,4/PK,20PK/CS: Brand: MEDLINE

## (undated) DEVICE — DRAPE MICSCP W132XL406CM LENS DIA68MM W VARI LENS2 FOR LEICA

## (undated) DEVICE — SOLUTION IV IRRIG WATER 1000ML POUR BRL 2F7114

## (undated) DEVICE — KARLIN BLADE, ULTRA KNIFE, SATIN, STAINLESS STEEL, 1.5 MM TIP, 4 IN, SINGLE USE, (10/PK): Brand: SYMMETRY SURGICAL

## (undated) DEVICE — SUTURE MCRYL SZ 4-0 L27IN ABSRB UD L19MM PS-2 1/2 CIR PRIM Y426H

## (undated) DEVICE — CASPAR DISTRACTION PIN 14MM: Brand: AESCULAP

## (undated) DEVICE — APPLICATOR PREP 26ML 0.7% IOD POVACRYLEX 74% ISO ALC ST

## (undated) DEVICE — 3M™ IOBAN™ 2 ANTIMICROBIAL INCISE DRAPE 6650EZ: Brand: IOBAN™ 2

## (undated) DEVICE — LOTION PREP REMV 5OZ IODO CLR TINC OF BENZ DURAPREP

## (undated) DEVICE — 3M™ TEGADERM™ TRANSPARENT FILM DRESSING FRAME STYLE, 1626W, 4 IN X 4-3/4 IN (10 CM X 12 CM), 50/CT 4CT/CASE: Brand: 3M™ TEGADERM™

## (undated) DEVICE — SPONGES GAUZE X-RAY 4X4 16PLY

## (undated) DEVICE — NEEDLE SPNL L3.5IN PNK HUB S STL REG WALL FIT STYL W/ QNCKE

## (undated) DEVICE — KIT OR ROOM TURNOVER W/STRAP

## (undated) DEVICE — GOWNS CAPE 3 PLY WHT DISPOSABLE 30 X 42INCH

## (undated) DEVICE — HEADREST POS OD9IN THICKNESS 2IN RASPBERRY FOAM RNG CUSH

## (undated) DEVICE — BLADE CLIPPER GEN PURP NS

## (undated) DEVICE — NEURO HBO 85949